# Patient Record
Sex: MALE | Race: BLACK OR AFRICAN AMERICAN
[De-identification: names, ages, dates, MRNs, and addresses within clinical notes are randomized per-mention and may not be internally consistent; named-entity substitution may affect disease eponyms.]

---

## 2018-04-10 ENCOUNTER — HOSPITAL ENCOUNTER (EMERGENCY)
Dept: HOSPITAL 87 - ER | Age: 43
Discharge: LEFT BEFORE DISCHARGE | End: 2018-04-10
Payer: MEDICAID

## 2018-04-10 VITALS — DIASTOLIC BLOOD PRESSURE: 84 MMHG | SYSTOLIC BLOOD PRESSURE: 130 MMHG

## 2018-04-10 VITALS — WEIGHT: 191.8 LBS | BODY MASS INDEX: 30.82 KG/M2 | HEIGHT: 66 IN

## 2018-04-10 DIAGNOSIS — M41.9: ICD-10-CM

## 2018-04-10 DIAGNOSIS — J44.9: ICD-10-CM

## 2018-04-10 DIAGNOSIS — Z88.8: ICD-10-CM

## 2018-04-10 DIAGNOSIS — F17.200: ICD-10-CM

## 2018-04-10 DIAGNOSIS — Z79.82: ICD-10-CM

## 2018-04-10 DIAGNOSIS — R07.9: Primary | ICD-10-CM

## 2018-04-10 DIAGNOSIS — R68.89: ICD-10-CM

## 2018-04-10 LAB
BASOPHILS NFR BLD AUTO: 0.8 % (ref 0–2)
CHLORIDE SERPL-SCNC: 106 MEQ/L (ref 98–107)
EOSINOPHIL NFR BLD AUTO: 3.4 % (ref 0–5)
ERYTHROCYTE [DISTWIDTH] IN BLOOD BY AUTOMATED COUNT: 13.1 % (ref 11.6–14.6)
ETHANOL SERPL-MCNC: < 10 MG/DL
HCT VFR BLD AUTO: 43 % (ref 42–52)
HGB BLD-MCNC: 15 G/DL (ref 14–18)
INR PPP: 1
LYMPHOCYTES NFR BLD AUTO: 42.1 % (ref 20–50)
MCH RBC QN AUTO: 33 PG (ref 28–32)
MCV RBC AUTO: 94.7 FL (ref 80–94)
MONOCYTES NFR BLD AUTO: 8.9 % (ref 2–8)
NEUTROPHILS NFR BLD AUTO: 44.8 % (ref 40–76)
PLATELET # BLD AUTO: 182 X1000/UL (ref 130–400)
PMV BLD AUTO: 8.7 FL (ref 7.4–10.4)
PROTHROMBIN TIME: 10.8 SEC (ref 9.4–11.6)
RBC # BLD AUTO: 4.55 MILL/UL (ref 4.7–6.1)

## 2018-04-10 PROCEDURE — 93005 ELECTROCARDIOGRAM TRACING: CPT

## 2018-04-10 PROCEDURE — 83880 ASSAY OF NATRIURETIC PEPTIDE: CPT

## 2018-04-10 PROCEDURE — 99285 EMERGENCY DEPT VISIT HI MDM: CPT

## 2018-04-10 PROCEDURE — 85610 PROTHROMBIN TIME: CPT

## 2018-04-10 PROCEDURE — 94640 AIRWAY INHALATION TREATMENT: CPT

## 2018-04-10 PROCEDURE — 84484 ASSAY OF TROPONIN QUANT: CPT

## 2018-04-10 PROCEDURE — 80053 COMPREHEN METABOLIC PANEL: CPT

## 2018-04-10 PROCEDURE — 71045 X-RAY EXAM CHEST 1 VIEW: CPT

## 2018-04-10 PROCEDURE — 85025 COMPLETE CBC W/AUTO DIFF WBC: CPT

## 2018-04-10 PROCEDURE — 87804 INFLUENZA ASSAY W/OPTIC: CPT

## 2018-04-10 PROCEDURE — 36415 COLL VENOUS BLD VENIPUNCTURE: CPT

## 2018-08-14 NOTE — NUR
-------------------------------------------------------------------------------

           *** Note undone in [facility] - 08/14/18 at 1906 by LUCINDA ***           

-------------------------------------------------------------------------------

Patient discharged to home in stable condition. Written and verbal after care 
instructions given. Patient verbalizes understanding of instruction.

## 2018-08-14 NOTE — NUR
C/O FEELING SUICIDAL WITH PLAN TO JUMP OFF THE BRIDGE, NAD NOTED, VSS, RESP 
EVEN AND UNLABORED, PT WAS PUT ON MONITOR, WAITING FOR MD SNOW

## 2018-09-27 ENCOUNTER — HOSPITAL ENCOUNTER (EMERGENCY)
Dept: HOSPITAL 54 - ER | Age: 43
Discharge: LEFT BEFORE DISCHARGE | End: 2018-09-27
Payer: COMMERCIAL

## 2018-09-27 DIAGNOSIS — Z53.21: Primary | ICD-10-CM

## 2019-08-20 ENCOUNTER — HOSPITAL ENCOUNTER (EMERGENCY)
Dept: HOSPITAL 54 - ER | Age: 44
Discharge: PSYCHIATRIC OR SUBSTANCE USE FACILITY | End: 2019-08-20
Payer: COMMERCIAL

## 2019-08-20 VITALS — WEIGHT: 188 LBS | HEIGHT: 72 IN | BODY MASS INDEX: 25.47 KG/M2

## 2019-08-20 VITALS — DIASTOLIC BLOOD PRESSURE: 80 MMHG | SYSTOLIC BLOOD PRESSURE: 128 MMHG

## 2019-08-20 DIAGNOSIS — Z88.6: ICD-10-CM

## 2019-08-20 DIAGNOSIS — M32.11: ICD-10-CM

## 2019-08-20 DIAGNOSIS — Z88.8: ICD-10-CM

## 2019-08-20 DIAGNOSIS — F29: Primary | ICD-10-CM
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
## 2019-08-20 DIAGNOSIS — F10.10: ICD-10-CM

## 2019-08-20 DIAGNOSIS — F41.9: ICD-10-CM

## 2019-08-20 DIAGNOSIS — F32.9: ICD-10-CM

## 2019-08-20 DIAGNOSIS — I10: ICD-10-CM

## 2019-08-20 DIAGNOSIS — F12.10: ICD-10-CM

## 2019-08-20 DIAGNOSIS — J44.9: ICD-10-CM

## 2019-08-20 DIAGNOSIS — L30.8: ICD-10-CM

## 2019-08-20 DIAGNOSIS — Z59.0: ICD-10-CM

## 2019-08-20 LAB
ALBUMIN SERPL BCP-MCNC: 3.8 G/DL (ref 3.4–5)
ALP SERPL-CCNC: 69 U/L (ref 46–116)
ALT SERPL W P-5'-P-CCNC: 21 U/L (ref 12–78)
APAP SERPL-MCNC: < 10 UG/ML (ref 10–30)
AST SERPL W P-5'-P-CCNC: 17 U/L (ref 15–37)
BASOPHILS # BLD AUTO: 0 /CMM (ref 0–0.2)
BASOPHILS NFR BLD AUTO: 0.4 % (ref 0–2)
BILIRUB DIRECT SERPL-MCNC: 0.1 MG/DL (ref 0–0.2)
BILIRUB SERPL-MCNC: 0.6 MG/DL (ref 0.2–1)
BILIRUB UR QL STRIP: (no result)
BUN SERPL-MCNC: 18 MG/DL (ref 7–18)
CALCIUM SERPL-MCNC: 9.1 MG/DL (ref 8.5–10.1)
CHLORIDE SERPL-SCNC: 102 MMOL/L (ref 98–107)
CO2 SERPL-SCNC: 27 MMOL/L (ref 21–32)
COLOR UR: (no result)
CREAT SERPL-MCNC: 1.1 MG/DL (ref 0.6–1.3)
EOSINOPHIL NFR BLD AUTO: 1.8 % (ref 0–6)
ETHANOL SERPL-MCNC: < 3 MG/DL (ref 0–0)
GLUCOSE SERPL-MCNC: 124 MG/DL (ref 74–106)
HCT VFR BLD AUTO: 50 % (ref 39–51)
HGB BLD-MCNC: 17.4 G/DL (ref 13.5–17.5)
KETONES UR STRIP-MCNC: 40 MG/DL
LYMPHOCYTES NFR BLD AUTO: 2.4 /CMM (ref 0.8–4.8)
LYMPHOCYTES NFR BLD AUTO: 37.6 % (ref 20–44)
MCHC RBC AUTO-ENTMCNC: 35 G/DL (ref 31–36)
MCV RBC AUTO: 98 FL (ref 80–96)
MONOCYTES NFR BLD AUTO: 0.7 /CMM (ref 0.1–1.3)
MONOCYTES NFR BLD AUTO: 10.2 % (ref 2–12)
NEUTROPHILS # BLD AUTO: 3.2 /CMM (ref 1.8–8.9)
NEUTROPHILS NFR BLD AUTO: 50 % (ref 43–81)
PH UR STRIP: 5.5 [PH] (ref 5–8)
PLATELET # BLD AUTO: 150 /CMM (ref 150–450)
POTASSIUM SERPL-SCNC: 3.8 MMOL/L (ref 3.5–5.1)
PROT SERPL-MCNC: 7.5 G/DL (ref 6.4–8.2)
PROT UR QL STRIP: 30 MG/DL
RBC # BLD AUTO: 5.1 MIL/UL (ref 4.5–6)
RBC #/AREA URNS HPF: (no result) /HPF (ref 0–2)
SALICYLATES SERPL-MCNC: 9.4 MG/DL (ref 2.8–20)
SODIUM SERPL-SCNC: 139 MMOL/L (ref 136–145)
UROBILINOGEN UR STRIP-MCNC: 4 EU/DL
WBC #/AREA URNS HPF: (no result) /HPF (ref 0–3)
WBC NRBC COR # BLD AUTO: 6.5 K/UL (ref 4.3–11)

## 2019-08-20 PROCEDURE — G0480 DRUG TEST DEF 1-7 CLASSES: HCPCS

## 2019-08-20 PROCEDURE — 80329 ANALGESICS NON-OPIOID 1 OR 2: CPT

## 2019-08-20 PROCEDURE — 99285 EMERGENCY DEPT VISIT HI MDM: CPT

## 2019-08-20 PROCEDURE — 80307 DRUG TEST PRSMV CHEM ANLYZR: CPT

## 2019-08-20 PROCEDURE — 81001 URINALYSIS AUTO W/SCOPE: CPT

## 2019-08-20 PROCEDURE — 36415 COLL VENOUS BLD VENIPUNCTURE: CPT

## 2019-08-20 PROCEDURE — 80305 DRUG TEST PRSMV DIR OPT OBS: CPT

## 2019-08-20 PROCEDURE — 80048 BASIC METABOLIC PNL TOTAL CA: CPT

## 2019-08-20 PROCEDURE — 85025 COMPLETE CBC W/AUTO DIFF WBC: CPT

## 2019-08-20 PROCEDURE — 80076 HEPATIC FUNCTION PANEL: CPT

## 2019-08-20 SDOH — ECONOMIC STABILITY - HOUSING INSECURITY: HOMELESSNESS: Z59.0

## 2019-08-20 NOTE — NUR
"Feeling Suicidal for 4-5days I want to go into traffic or cut myself". PT 
AAOX3, VSS. DENIES CP, SOB, DIZZINESS, N/V @ THIS TIME. SEEN & EVAL'D BY 
TWAN ELLER. CALM & COOPERATIVE, WILMAN @ BS & NELLY CONT TO MONITOR.

## 2019-08-20 NOTE — NUR
CALL FROM TERRENCE BORGES. [facility] BY [facility] WEST. # FOR REPORT [phone]

## 2019-08-20 NOTE — NUR
Social service consult requested by TWAN Wyman for suicidal ideations and a plan to run into 
traffic. Pt. is a 43 year old male who came to [facility] for suicidal ideation and wanting 
voluntary psychiatric admission. Pt. has been to [facility] in the past for the same complaint. SW 
met with pt. bedside. Pt. is alert and oriented x 4. Pt. appears disheveled and unkempt. 
Pt's eyes are blood shot red.  Pt. stated, " I am suicidal and have a plan to run into 
traffic." Pt. has a history of psychiatric hospitalizations. Pt. was taking Xanax but 
stopped taking it abruptly. Pt. is currently homeless and he has been staying on the streets 
for the past 10 years. When SW inquired why he hasn't linked with services in the community 
to aid in his homelessness, pt. shrugs his shoulders. Pt. receives GR and food stamps. Pt. 
drinks approximately 3 to 4 bottles of beer daily and smokes marijuana. Pt. will require UD 
for toxicology. Pt. is requesting voluntary admit to [facility]. 
JOSEPH Whitley has been notified of pt. [facility]. JOSEPH Whitley to fax clinicals to [facility] intake department once lab results are in. TWAN Wyman is 
aware of pt's discharge plan.

## 2019-11-09 ENCOUNTER — HOSPITAL ENCOUNTER (EMERGENCY)
Dept: HOSPITAL 54 - ER | Age: 44
LOS: 1 days | Discharge: PSYCHIATRIC OR SUBSTANCE USE FACILITY | End: 2019-11-10
Payer: COMMERCIAL

## 2019-11-09 VITALS — WEIGHT: 200 LBS | BODY MASS INDEX: 32.14 KG/M2 | HEIGHT: 66 IN

## 2019-11-09 DIAGNOSIS — Z88.8: ICD-10-CM

## 2019-11-09 DIAGNOSIS — J44.9: ICD-10-CM

## 2019-11-09 DIAGNOSIS — R45.851: Primary | ICD-10-CM

## 2019-11-09 DIAGNOSIS — Z88.6: ICD-10-CM

## 2019-11-09 DIAGNOSIS — F10.10: ICD-10-CM

## 2019-11-09 DIAGNOSIS — M79.661: ICD-10-CM

## 2019-11-09 DIAGNOSIS — I10: ICD-10-CM

## 2019-11-09 DIAGNOSIS — F12.10: ICD-10-CM

## 2019-11-09 DIAGNOSIS — F32.9: ICD-10-CM

## 2019-11-09 DIAGNOSIS — F17.210: ICD-10-CM

## 2019-11-09 DIAGNOSIS — Z59.0: ICD-10-CM
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
## 2019-11-09 DIAGNOSIS — Z98.890: ICD-10-CM

## 2019-11-09 DIAGNOSIS — M79.662: ICD-10-CM

## 2019-11-09 LAB
ALBUMIN SERPL BCP-MCNC: 3.2 G/DL (ref 3.4–5)
ALP SERPL-CCNC: 57 U/L (ref 46–116)
ALT SERPL W P-5'-P-CCNC: 27 U/L (ref 12–78)
APAP SERPL-MCNC: < 2 UG/ML (ref 10–30)
AST SERPL W P-5'-P-CCNC: 22 U/L (ref 15–37)
BASOPHILS # BLD AUTO: 0.1 /CMM (ref 0–0.2)
BASOPHILS NFR BLD AUTO: 1.1 % (ref 0–2)
BILIRUB DIRECT SERPL-MCNC: 0.1 MG/DL (ref 0–0.2)
BILIRUB SERPL-MCNC: 0.3 MG/DL (ref 0.2–1)
BUN SERPL-MCNC: 22 MG/DL (ref 7–18)
CALCIUM SERPL-MCNC: 9.2 MG/DL (ref 8.5–10.1)
CHLORIDE SERPL-SCNC: 104 MMOL/L (ref 98–107)
CO2 SERPL-SCNC: 26 MMOL/L (ref 21–32)
CREAT SERPL-MCNC: 1.1 MG/DL (ref 0.6–1.3)
EOSINOPHIL NFR BLD AUTO: 2.9 % (ref 0–6)
ETHANOL SERPL-MCNC: < 3 MG/DL (ref 0–0)
GLUCOSE SERPL-MCNC: 113 MG/DL (ref 74–106)
HCT VFR BLD AUTO: 43 % (ref 39–51)
HGB BLD-MCNC: 14.7 G/DL (ref 13.5–17.5)
LYMPHOCYTES NFR BLD AUTO: 2.6 /CMM (ref 0.8–4.8)
LYMPHOCYTES NFR BLD AUTO: 31.4 % (ref 20–44)
MCHC RBC AUTO-ENTMCNC: 34 G/DL (ref 31–36)
MCV RBC AUTO: 98 FL (ref 80–96)
MONOCYTES NFR BLD AUTO: 0.9 /CMM (ref 0.1–1.3)
MONOCYTES NFR BLD AUTO: 11.5 % (ref 2–12)
NEUTROPHILS # BLD AUTO: 4.4 /CMM (ref 1.8–8.9)
NEUTROPHILS NFR BLD AUTO: 53.1 % (ref 43–81)
PH UR STRIP: 7.5 [PH] (ref 5–8)
PLATELET # BLD AUTO: 216 /CMM (ref 150–450)
POTASSIUM SERPL-SCNC: 4.4 MMOL/L (ref 3.5–5.1)
PROT SERPL-MCNC: 7.2 G/DL (ref 6.4–8.2)
RBC # BLD AUTO: 4.41 MIL/UL (ref 4.5–6)
RBC #/AREA URNS HPF: (no result) /HPF (ref 0–2)
SALICYLATES SERPL-MCNC: < 2.8 MG/DL (ref 2.8–20)
SODIUM SERPL-SCNC: 140 MMOL/L (ref 136–145)
UROBILINOGEN UR STRIP-MCNC: 4 EU/DL
WBC #/AREA URNS HPF: (no result) /HPF (ref 0–3)
WBC NRBC COR # BLD AUTO: 8.2 K/UL (ref 4.3–11)

## 2019-11-09 PROCEDURE — 36415 COLL VENOUS BLD VENIPUNCTURE: CPT

## 2019-11-09 PROCEDURE — 99285 EMERGENCY DEPT VISIT HI MDM: CPT

## 2019-11-09 PROCEDURE — 80329 ANALGESICS NON-OPIOID 1 OR 2: CPT

## 2019-11-09 PROCEDURE — 80048 BASIC METABOLIC PNL TOTAL CA: CPT

## 2019-11-09 PROCEDURE — 85025 COMPLETE CBC W/AUTO DIFF WBC: CPT

## 2019-11-09 PROCEDURE — 80076 HEPATIC FUNCTION PANEL: CPT

## 2019-11-09 PROCEDURE — 81001 URINALYSIS AUTO W/SCOPE: CPT

## 2019-11-09 PROCEDURE — 73590 X-RAY EXAM OF LOWER LEG: CPT

## 2019-11-09 PROCEDURE — G0480 DRUG TEST DEF 1-7 CLASSES: HCPCS

## 2019-11-09 PROCEDURE — 80305 DRUG TEST PRSMV DIR OPT OBS: CPT

## 2019-11-09 PROCEDURE — 80307 DRUG TEST PRSMV CHEM ANLYZR: CPT

## 2019-11-09 SDOH — ECONOMIC STABILITY - HOUSING INSECURITY: HOMELESSNESS: Z59.0

## 2019-11-10 VITALS — DIASTOLIC BLOOD PRESSURE: 84 MMHG | SYSTOLIC BLOOD PRESSURE: 142 MMHG

## 2019-11-10 NOTE — NUR
TRANSFER INFO:

[facility]  [facility], ACCEPTING MD IS DR. POSEY, NUMBER FOR REPORT 
[phone]. SPOKE TO RAZIA.

-------------------------------------------------------------------------------

Addendum: 11/10/19 at 1358 by HERB

-------------------------------------------------------------------------------

TRANSFER INFO:

[facility], ACCEPTING MD IS DR. POSEY, NUMBER FOR REPORT 
[phone]. SPOKE TO RAZIA.

## 2019-11-10 NOTE — NUR
patient awake alert to name non distress @ this time sitter @ bedside follows 
command continue to monitor

## 2019-11-10 NOTE — NUR
Patient is resting comfortably in bed with eyes closed. Easily aroused. VSS. 
GIVEN SANDWICH. AMBULATED TO RESTROOM.

## 2019-11-10 NOTE — NUR
PT IS AWAKE, AAOX4, NOT IN RESPIRATORY DISTRESS, V/S STABLE, KEPT RESTED AND 
COMFORTABLE, WILL CONTINUE TO MONITOR.

## 2019-11-10 NOTE — NUR
Patient is resting comfortably in bed with eyes closed. Easily aroused. VSS. 
AMBULATED TO RESTROOM. -ACUTE DISTRESS NOTED.

## 2020-01-27 ENCOUNTER — HOSPITAL ENCOUNTER (EMERGENCY)
Dept: HOSPITAL 54 - ER | Age: 45
Discharge: NURSING, REHAB OR LONG-TERM CARE | End: 2020-01-27
Payer: COMMERCIAL

## 2020-01-27 VITALS — WEIGHT: 200 LBS | HEIGHT: 66 IN | BODY MASS INDEX: 32.14 KG/M2

## 2020-01-27 VITALS — SYSTOLIC BLOOD PRESSURE: 124 MMHG | DIASTOLIC BLOOD PRESSURE: 62 MMHG

## 2020-01-27 DIAGNOSIS — J45.909: ICD-10-CM

## 2020-01-27 DIAGNOSIS — I10: ICD-10-CM
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
## 2020-01-27 DIAGNOSIS — Z88.8: ICD-10-CM

## 2020-01-27 DIAGNOSIS — Z59.0: ICD-10-CM

## 2020-01-27 DIAGNOSIS — F17.200: ICD-10-CM

## 2020-01-27 DIAGNOSIS — F10.10: ICD-10-CM

## 2020-01-27 DIAGNOSIS — Z88.6: ICD-10-CM

## 2020-01-27 DIAGNOSIS — J21.9: Primary | ICD-10-CM

## 2020-01-27 DIAGNOSIS — Z98.890: ICD-10-CM

## 2020-01-27 LAB
BASOPHILS # BLD AUTO: 0.1 /CMM (ref 0–0.2)
BASOPHILS NFR BLD AUTO: 0.7 % (ref 0–2)
BUN SERPL-MCNC: 22 MG/DL (ref 7–18)
CALCIUM SERPL-MCNC: 9 MG/DL (ref 8.5–10.1)
CHLORIDE SERPL-SCNC: 106 MMOL/L (ref 98–107)
CO2 SERPL-SCNC: 26 MMOL/L (ref 21–32)
CREAT SERPL-MCNC: 1.2 MG/DL (ref 0.6–1.3)
EOSINOPHIL NFR BLD AUTO: 2.8 % (ref 0–6)
GLUCOSE SERPL-MCNC: 223 MG/DL (ref 74–106)
HCT VFR BLD AUTO: 43 % (ref 39–51)
HGB BLD-MCNC: 14.6 G/DL (ref 13.5–17.5)
LYMPHOCYTES NFR BLD AUTO: 1.7 /CMM (ref 0.8–4.8)
LYMPHOCYTES NFR BLD AUTO: 22 % (ref 20–44)
MCHC RBC AUTO-ENTMCNC: 34 G/DL (ref 31–36)
MCV RBC AUTO: 96 FL (ref 80–96)
MONOCYTES NFR BLD AUTO: 0.6 /CMM (ref 0.1–1.3)
MONOCYTES NFR BLD AUTO: 7.3 % (ref 2–12)
NEUTROPHILS # BLD AUTO: 5.1 /CMM (ref 1.8–8.9)
NEUTROPHILS NFR BLD AUTO: 67.2 % (ref 43–81)
PLATELET # BLD AUTO: 182 /CMM (ref 150–450)
POTASSIUM SERPL-SCNC: 4 MMOL/L (ref 3.5–5.1)
RBC # BLD AUTO: 4.46 MIL/UL (ref 4.5–6)
SODIUM SERPL-SCNC: 141 MMOL/L (ref 136–145)
WBC NRBC COR # BLD AUTO: 7.6 K/UL (ref 4.3–11)

## 2020-01-27 SDOH — ECONOMIC STABILITY - HOUSING INSECURITY: HOMELESSNESS: Z59.0

## 2020-01-27 NOTE — NUR
[id number] 44 year old male from [facility], c/o cough, fever, sob in 
inspiration. alert and oriented x3, breathing even unlabored. skin intact. 
waiting to be seen by md.

## 2020-04-15 ENCOUNTER — HOSPITAL ENCOUNTER (EMERGENCY)
Dept: HOSPITAL 54 - ER | Age: 45
LOS: 1 days | Discharge: ANOTHER HOSPITAL | End: 2020-04-16
Payer: MEDICAID

## 2020-04-15 VITALS — HEIGHT: 66 IN | WEIGHT: 180 LBS | BODY MASS INDEX: 28.93 KG/M2

## 2020-04-15 DIAGNOSIS — M19.90: ICD-10-CM

## 2020-04-15 DIAGNOSIS — F12.10: ICD-10-CM

## 2020-04-15 DIAGNOSIS — R45.851: Primary | ICD-10-CM

## 2020-04-15 DIAGNOSIS — I10: ICD-10-CM

## 2020-04-15 DIAGNOSIS — J44.9: ICD-10-CM

## 2020-04-15 DIAGNOSIS — R22.43: ICD-10-CM

## 2020-04-15 DIAGNOSIS — Z88.8: ICD-10-CM

## 2020-04-15 DIAGNOSIS — F17.210: ICD-10-CM

## 2020-04-15 DIAGNOSIS — F32.9: ICD-10-CM

## 2020-04-15 DIAGNOSIS — Z59.0: ICD-10-CM

## 2020-04-15 DIAGNOSIS — Z98.890: ICD-10-CM

## 2020-04-15 DIAGNOSIS — Z88.6: ICD-10-CM

## 2020-04-15 LAB
ALBUMIN SERPL BCP-MCNC: 3.6 G/DL (ref 3.4–5)
ALP SERPL-CCNC: 67 U/L (ref 46–116)
ALT SERPL W P-5'-P-CCNC: 23 U/L (ref 12–78)
APAP SERPL-MCNC: < 1 UG/ML (ref 10–30)
APPEARANCE UR: (no result)
AST SERPL W P-5'-P-CCNC: 15 U/L (ref 15–37)
BASOPHILS # BLD AUTO: 0.1 /CMM (ref 0–0.2)
BASOPHILS NFR BLD AUTO: 0.8 % (ref 0–2)
BILIRUB DIRECT SERPL-MCNC: 0.1 MG/DL (ref 0–0.2)
BILIRUB SERPL-MCNC: 0.3 MG/DL (ref 0.2–1)
BILIRUB UR QL STRIP: (no result)
BUN SERPL-MCNC: 18 MG/DL (ref 7–18)
CALCIUM SERPL-MCNC: 8.6 MG/DL (ref 8.5–10.1)
CHLORIDE SERPL-SCNC: 104 MMOL/L (ref 98–107)
CO2 SERPL-SCNC: 31 MMOL/L (ref 21–32)
CREAT SERPL-MCNC: 1.4 MG/DL (ref 0.6–1.3)
EOSINOPHIL NFR BLD AUTO: 1.9 % (ref 0–6)
ETHANOL SERPL-MCNC: < 3 MG/DL (ref 0–0)
GLUCOSE SERPL-MCNC: 98 MG/DL (ref 74–106)
HCT VFR BLD AUTO: 45 % (ref 39–51)
HGB BLD-MCNC: 15.4 G/DL (ref 13.5–17.5)
KETONES UR STRIP-MCNC: 15 MG/DL
LYMPHOCYTES NFR BLD AUTO: 2.6 /CMM (ref 0.8–4.8)
LYMPHOCYTES NFR BLD AUTO: 36.7 % (ref 20–44)
MCHC RBC AUTO-ENTMCNC: 34 G/DL (ref 31–36)
MCV RBC AUTO: 95 FL (ref 80–96)
MONOCYTES NFR BLD AUTO: 0.7 /CMM (ref 0.1–1.3)
MONOCYTES NFR BLD AUTO: 9.9 % (ref 2–12)
NEUTROPHILS # BLD AUTO: 3.6 /CMM (ref 1.8–8.9)
NEUTROPHILS NFR BLD AUTO: 50.7 % (ref 43–81)
NT-PROBNP SERPL-MCNC: 62 PG/ML (ref 0–125)
PH UR STRIP: 6 [PH] (ref 5–8)
PLATELET # BLD AUTO: 179 /CMM (ref 150–450)
POTASSIUM SERPL-SCNC: 4 MMOL/L (ref 3.5–5.1)
PROT SERPL-MCNC: 7 G/DL (ref 6.4–8.2)
RBC # BLD AUTO: 4.69 MIL/UL (ref 4.5–6)
RBC #/AREA URNS HPF: (no result) /HPF (ref 0–2)
SALICYLATES SERPL-MCNC: 7.5 MG/DL (ref 2.8–20)
SODIUM SERPL-SCNC: 139 MMOL/L (ref 136–145)
UROBILINOGEN UR STRIP-MCNC: 0.2 EU/DL
WBC #/AREA URNS HPF: (no result) /HPF (ref 0–3)
WBC NRBC COR # BLD AUTO: 7.1 K/UL (ref 4.3–11)

## 2020-04-15 PROCEDURE — 36415 COLL VENOUS BLD VENIPUNCTURE: CPT

## 2020-04-15 PROCEDURE — 93970 EXTREMITY STUDY: CPT

## 2020-04-15 PROCEDURE — 93005 ELECTROCARDIOGRAM TRACING: CPT

## 2020-04-15 PROCEDURE — 83880 ASSAY OF NATRIURETIC PEPTIDE: CPT

## 2020-04-15 PROCEDURE — 80305 DRUG TEST PRSMV DIR OPT OBS: CPT

## 2020-04-15 PROCEDURE — 71045 X-RAY EXAM CHEST 1 VIEW: CPT

## 2020-04-15 PROCEDURE — G0480 DRUG TEST DEF 1-7 CLASSES: HCPCS

## 2020-04-15 PROCEDURE — 80307 DRUG TEST PRSMV CHEM ANLYZR: CPT

## 2020-04-15 PROCEDURE — 80329 ANALGESICS NON-OPIOID 1 OR 2: CPT

## 2020-04-15 PROCEDURE — 80076 HEPATIC FUNCTION PANEL: CPT

## 2020-04-15 PROCEDURE — 84484 ASSAY OF TROPONIN QUANT: CPT

## 2020-04-15 PROCEDURE — 85025 COMPLETE CBC W/AUTO DIFF WBC: CPT

## 2020-04-15 PROCEDURE — 99285 EMERGENCY DEPT VISIT HI MDM: CPT

## 2020-04-15 PROCEDURE — 80048 BASIC METABOLIC PNL TOTAL CA: CPT

## 2020-04-15 PROCEDURE — 81001 URINALYSIS AUTO W/SCOPE: CPT

## 2020-04-15 PROCEDURE — 71046 X-RAY EXAM CHEST 2 VIEWS: CPT

## 2020-04-15 SDOH — ECONOMIC STABILITY - HOUSING INSECURITY: HOMELESSNESS: Z59.0

## 2020-04-15 NOTE — NUR
PATIENT CAME TO ER BED 15 C/O SUICIDAL IDEATION PLANS TO RUN INTO TRAFFIC. 
PATIENT IS EASILY IRRITABLE. DENIES ANY PAIN. BREATHING EVENLY AND UNLABORED ON 
ROOM AIR. CONNECTED TO MONITOR. SITTER AT BEDSIDE. PATIENT'S BELONGINGS AND 
CLOTHINGS ARE REMOVED AND PLACED INTO A LOCKER. PATIENT IS CHANGED IN A GOWN.

## 2020-04-16 VITALS — SYSTOLIC BLOOD PRESSURE: 127 MMHG | DIASTOLIC BLOOD PRESSURE: 71 MMHG

## 2020-04-16 NOTE — NUR
PATIENT IS MAKING INAPPROPRIATE COMMENTS TO STAFF. PATIENT IS UNDIRECTABLE. 
PATIENT IS ASKING FOR "SOMETHING TO HELP RELAX" OR SLEEPING AID. MD NOTIFIED.

## 2020-04-16 NOTE — NUR
REPORT GIVEN TO BEV YOUNGBLOOD AT [facility] FOR RYLAN.

## 2020-04-16 NOTE — NUR
-------------------------------------------------------------------------------

          *** Note undone in ED - 04/16/20 at 0430 by CBATAJORDY ***           

-------------------------------------------------------------------------------

Pt accepted at [facility] by Dr. Camargo. # for 
report [phone].

## 2020-06-29 ENCOUNTER — HOSPITAL ENCOUNTER (EMERGENCY)
Dept: HOSPITAL 87 - ER | Age: 45
Discharge: HOME | End: 2020-06-29
Payer: MEDICAID

## 2020-06-29 VITALS — DIASTOLIC BLOOD PRESSURE: 63 MMHG | SYSTOLIC BLOOD PRESSURE: 116 MMHG

## 2020-06-29 VITALS — BODY MASS INDEX: 34.01 KG/M2 | HEIGHT: 66 IN | WEIGHT: 211.64 LBS

## 2020-06-29 DIAGNOSIS — Z76.0: Primary | ICD-10-CM

## 2020-06-29 PROCEDURE — 99283 EMERGENCY DEPT VISIT LOW MDM: CPT

## 2020-10-29 ENCOUNTER — HOSPITAL ENCOUNTER (EMERGENCY)
Dept: HOSPITAL 54 - ER | Age: 45
Discharge: PSYCHIATRIC OR SUBSTANCE USE FACILITY | End: 2020-10-29
Payer: MEDICAID

## 2020-10-29 VITALS — WEIGHT: 240 LBS | HEIGHT: 66 IN | BODY MASS INDEX: 38.57 KG/M2

## 2020-10-29 VITALS — DIASTOLIC BLOOD PRESSURE: 83 MMHG | SYSTOLIC BLOOD PRESSURE: 143 MMHG

## 2020-10-29 DIAGNOSIS — Z88.8: ICD-10-CM

## 2020-10-29 DIAGNOSIS — F20.0: ICD-10-CM

## 2020-10-29 DIAGNOSIS — F31.9: ICD-10-CM

## 2020-10-29 DIAGNOSIS — I48.91: ICD-10-CM

## 2020-10-29 DIAGNOSIS — J44.9: ICD-10-CM

## 2020-10-29 DIAGNOSIS — N32.81: ICD-10-CM

## 2020-10-29 DIAGNOSIS — I10: ICD-10-CM

## 2020-10-29 DIAGNOSIS — M19.90: ICD-10-CM

## 2020-10-29 DIAGNOSIS — Z88.6: ICD-10-CM

## 2020-10-29 DIAGNOSIS — R45.851: Primary | ICD-10-CM

## 2020-10-29 DIAGNOSIS — M54.5: ICD-10-CM

## 2020-10-29 DIAGNOSIS — F17.210: ICD-10-CM

## 2020-10-29 DIAGNOSIS — Z20.828: ICD-10-CM

## 2020-10-29 DIAGNOSIS — G89.29: ICD-10-CM

## 2020-10-29 LAB
ALBUMIN SERPL BCP-MCNC: 3.9 G/DL (ref 3.4–5)
ALP SERPL-CCNC: 71 U/L (ref 46–116)
ALT SERPL W P-5'-P-CCNC: 20 U/L (ref 12–78)
APAP SERPL-MCNC: < 10 UG/ML (ref 10–30)
APPEARANCE UR: CLEAR
AST SERPL W P-5'-P-CCNC: 20 U/L (ref 15–37)
BASOPHILS # BLD AUTO: 0.1 /CMM (ref 0–0.2)
BASOPHILS NFR BLD AUTO: 0.7 % (ref 0–2)
BILIRUB DIRECT SERPL-MCNC: 0.1 MG/DL (ref 0–0.2)
BILIRUB SERPL-MCNC: 0.5 MG/DL (ref 0.2–1)
BILIRUB UR QL STRIP: (no result)
BUN SERPL-MCNC: 20 MG/DL (ref 7–18)
CALCIUM SERPL-MCNC: 9 MG/DL (ref 8.5–10.1)
CHLORIDE SERPL-SCNC: 99 MMOL/L (ref 98–107)
CO2 SERPL-SCNC: 30 MMOL/L (ref 21–32)
COLOR UR: YELLOW
CREAT SERPL-MCNC: 1.2 MG/DL (ref 0.6–1.3)
EOSINOPHIL NFR BLD AUTO: 1.7 % (ref 0–6)
ETHANOL SERPL-MCNC: < 3 MG/DL (ref 0–0)
GLUCOSE SERPL-MCNC: 88 MG/DL (ref 74–106)
GLUCOSE UR STRIP-MCNC: NEGATIVE MG/DL
HCT VFR BLD AUTO: 46 % (ref 39–51)
HGB BLD-MCNC: 15.6 G/DL (ref 13.5–17.5)
HGB UR QL STRIP: NEGATIVE ERY/UL
LEUKOCYTE ESTERASE UR QL STRIP: NEGATIVE
LYMPHOCYTES NFR BLD AUTO: 3.8 /CMM (ref 0.8–4.8)
LYMPHOCYTES NFR BLD AUTO: 36.4 % (ref 20–44)
MCHC RBC AUTO-ENTMCNC: 34 G/DL (ref 31–36)
MCV RBC AUTO: 93 FL (ref 80–96)
MONOCYTES NFR BLD AUTO: 0.9 /CMM (ref 0.1–1.3)
MONOCYTES NFR BLD AUTO: 8.3 % (ref 2–12)
NEUTROPHILS # BLD AUTO: 5.5 /CMM (ref 1.8–8.9)
NEUTROPHILS NFR BLD AUTO: 52.9 % (ref 43–81)
NITRITE UR QL STRIP: NEGATIVE
PH UR STRIP: 6 [PH] (ref 5–8)
PLATELET # BLD AUTO: 234 /CMM (ref 150–450)
POTASSIUM SERPL-SCNC: 3.6 MMOL/L (ref 3.5–5.1)
PROT SERPL-MCNC: 8.1 G/DL (ref 6.4–8.2)
PROT UR QL STRIP: (no result) MG/DL
RBC # BLD AUTO: 4.94 MIL/UL (ref 4.5–6)
RBC #/AREA URNS HPF: (no result) /HPF (ref 0–2)
SODIUM SERPL-SCNC: 134 MMOL/L (ref 136–145)
UROBILINOGEN UR STRIP-MCNC: 0.2 EU/DL
WBC #/AREA URNS HPF: (no result) /HPF (ref 0–3)
WBC NRBC COR # BLD AUTO: 10.4 K/UL (ref 4.3–11)

## 2020-10-29 PROCEDURE — 80307 DRUG TEST PRSMV CHEM ANLYZR: CPT

## 2020-10-29 PROCEDURE — C9803 HOPD COVID-19 SPEC COLLECT: HCPCS

## 2020-10-29 PROCEDURE — 99406 BEHAV CHNG SMOKING 3-10 MIN: CPT

## 2020-10-29 PROCEDURE — 81001 URINALYSIS AUTO W/SCOPE: CPT

## 2020-10-29 PROCEDURE — 80299 QUANTITATIVE ASSAY DRUG: CPT

## 2020-10-29 PROCEDURE — 80076 HEPATIC FUNCTION PANEL: CPT

## 2020-10-29 PROCEDURE — 87426 SARSCOV CORONAVIRUS AG IA: CPT

## 2020-10-29 PROCEDURE — G0480 DRUG TEST DEF 1-7 CLASSES: HCPCS

## 2020-10-29 PROCEDURE — 99285 EMERGENCY DEPT VISIT HI MDM: CPT

## 2020-10-29 PROCEDURE — 85025 COMPLETE CBC W/AUTO DIFF WBC: CPT

## 2020-10-29 PROCEDURE — 80320 DRUG SCREEN QUANTALCOHOLS: CPT

## 2020-10-29 PROCEDURE — 80048 BASIC METABOLIC PNL TOTAL CA: CPT

## 2020-10-29 PROCEDURE — 36415 COLL VENOUS BLD VENIPUNCTURE: CPT

## 2020-10-29 NOTE — NUR
CALLED [facility] FOR TRANSPORT TO [facility]. RESERVATION NUMBER [id number]. WILL CALL 
BACK WITH BARRIE

## 2020-10-29 NOTE — NUR
C/O LOW BACK PAIN. SI WITH PLAN TO CUT WRIST. PT ADMITS TO DRINKING, PCP, 
MARIJUANA USE TONIGHT PT TO BED 6, NAD NOTED, SI PRECAUTIONS STARTED, -SOB, 
VSS, PENDING ER PROVIDER GWENDOLYN

## 2020-10-29 NOTE — NUR
Patient has been accepted [facility] per Tito [phone]. Report to be 
given at 11:30am to nursing supervisor [phone] with patient arriving at 12:30pm to 
[facility].

## 2021-01-08 ENCOUNTER — HOSPITAL ENCOUNTER (EMERGENCY)
Dept: HOSPITAL 54 - ER | Age: 46
Discharge: PSYCHIATRIC OR SUBSTANCE USE FACILITY | End: 2021-01-08
Payer: MEDICAID

## 2021-01-08 VITALS — SYSTOLIC BLOOD PRESSURE: 144 MMHG | DIASTOLIC BLOOD PRESSURE: 83 MMHG

## 2021-01-08 VITALS — HEIGHT: 70 IN | BODY MASS INDEX: 31.5 KG/M2 | WEIGHT: 220 LBS

## 2021-01-08 DIAGNOSIS — F31.9: ICD-10-CM

## 2021-01-08 DIAGNOSIS — F20.0: ICD-10-CM

## 2021-01-08 DIAGNOSIS — Z88.6: ICD-10-CM

## 2021-01-08 DIAGNOSIS — I48.91: ICD-10-CM

## 2021-01-08 DIAGNOSIS — Z88.8: ICD-10-CM

## 2021-01-08 DIAGNOSIS — J44.9: ICD-10-CM

## 2021-01-08 DIAGNOSIS — Z20.822: ICD-10-CM

## 2021-01-08 DIAGNOSIS — I10: ICD-10-CM

## 2021-01-08 DIAGNOSIS — F12.10: ICD-10-CM

## 2021-01-08 DIAGNOSIS — Z59.0: ICD-10-CM

## 2021-01-08 DIAGNOSIS — R45.851: Primary | ICD-10-CM

## 2021-01-08 DIAGNOSIS — M19.90: ICD-10-CM

## 2021-01-08 DIAGNOSIS — N32.81: ICD-10-CM

## 2021-01-08 LAB
ALBUMIN SERPL BCP-MCNC: 3.7 G/DL (ref 3.4–5)
ALP SERPL-CCNC: 73 U/L (ref 46–116)
ALT SERPL W P-5'-P-CCNC: 33 U/L (ref 12–78)
APAP SERPL-MCNC: 0 UG/ML (ref 10–30)
AST SERPL W P-5'-P-CCNC: 29 U/L (ref 15–37)
BASOPHILS # BLD AUTO: 0.1 /CMM (ref 0–0.2)
BASOPHILS NFR BLD AUTO: 0.9 % (ref 0–2)
BILIRUB DIRECT SERPL-MCNC: 0.1 MG/DL (ref 0–0.2)
BILIRUB SERPL-MCNC: 0.3 MG/DL (ref 0.2–1)
BUN SERPL-MCNC: 19 MG/DL (ref 7–18)
CALCIUM SERPL-MCNC: 9 MG/DL (ref 8.5–10.1)
CHLORIDE SERPL-SCNC: 100 MMOL/L (ref 98–107)
CO2 SERPL-SCNC: 29 MMOL/L (ref 21–32)
COLOR UR: YELLOW
CREAT SERPL-MCNC: 1.3 MG/DL (ref 0.6–1.3)
EOSINOPHIL NFR BLD AUTO: 1.6 % (ref 0–6)
ETHANOL SERPL-MCNC: < 3 MG/DL (ref 0–0)
GLUCOSE SERPL-MCNC: 141 MG/DL (ref 74–106)
HCT VFR BLD AUTO: 47 % (ref 39–51)
HGB BLD-MCNC: 16.2 G/DL (ref 13.5–17.5)
LYMPHOCYTES NFR BLD AUTO: 3.1 /CMM (ref 0.8–4.8)
LYMPHOCYTES NFR BLD AUTO: 31.5 % (ref 20–44)
MCHC RBC AUTO-ENTMCNC: 35 G/DL (ref 31–36)
MCV RBC AUTO: 93 FL (ref 80–96)
MONOCYTES NFR BLD AUTO: 0.9 /CMM (ref 0.1–1.3)
MONOCYTES NFR BLD AUTO: 9.4 % (ref 2–12)
NEUTROPHILS # BLD AUTO: 5.6 /CMM (ref 1.8–8.9)
NEUTROPHILS NFR BLD AUTO: 56.6 % (ref 43–81)
PH UR STRIP: 6 [PH] (ref 5–8)
PLATELET # BLD AUTO: 182 /CMM (ref 150–450)
POTASSIUM SERPL-SCNC: 4.4 MMOL/L (ref 3.5–5.1)
PROT SERPL-MCNC: 7.5 G/DL (ref 6.4–8.2)
RBC # BLD AUTO: 4.99 MIL/UL (ref 4.5–6)
RBC #/AREA URNS HPF: (no result) /HPF (ref 0–2)
SODIUM SERPL-SCNC: 138 MMOL/L (ref 136–145)
UROBILINOGEN UR STRIP-MCNC: 1 EU/DL
WBC #/AREA URNS HPF: (no result) /HPF (ref 0–3)
WBC NRBC COR # BLD AUTO: 10 K/UL (ref 4.3–11)

## 2021-01-08 PROCEDURE — 36415 COLL VENOUS BLD VENIPUNCTURE: CPT

## 2021-01-08 PROCEDURE — C9803 HOPD COVID-19 SPEC COLLECT: HCPCS

## 2021-01-08 PROCEDURE — 80299 QUANTITATIVE ASSAY DRUG: CPT

## 2021-01-08 PROCEDURE — 80048 BASIC METABOLIC PNL TOTAL CA: CPT

## 2021-01-08 PROCEDURE — 80076 HEPATIC FUNCTION PANEL: CPT

## 2021-01-08 PROCEDURE — 99285 EMERGENCY DEPT VISIT HI MDM: CPT

## 2021-01-08 PROCEDURE — 80320 DRUG SCREEN QUANTALCOHOLS: CPT

## 2021-01-08 PROCEDURE — 85025 COMPLETE CBC W/AUTO DIFF WBC: CPT

## 2021-01-08 PROCEDURE — 80307 DRUG TEST PRSMV CHEM ANLYZR: CPT

## 2021-01-08 PROCEDURE — G0480 DRUG TEST DEF 1-7 CLASSES: HCPCS

## 2021-01-08 PROCEDURE — 87426 SARSCOV CORONAVIRUS AG IA: CPT

## 2021-01-08 PROCEDURE — 81001 URINALYSIS AUTO W/SCOPE: CPT

## 2021-01-08 SDOH — ECONOMIC STABILITY - HOUSING INSECURITY: HOMELESSNESS: Z59.0

## 2021-06-05 ENCOUNTER — HOSPITAL ENCOUNTER (EMERGENCY)
Dept: HOSPITAL 54 - ER | Age: 46
LOS: 1 days | Discharge: PSYCHIATRIC OR SUBSTANCE USE FACILITY | End: 2021-06-06
Payer: MEDICAID

## 2021-06-05 VITALS — HEIGHT: 70 IN | WEIGHT: 220 LBS | BODY MASS INDEX: 31.5 KG/M2

## 2021-06-05 DIAGNOSIS — F20.0: ICD-10-CM

## 2021-06-05 DIAGNOSIS — Z20.822: ICD-10-CM

## 2021-06-05 DIAGNOSIS — I48.91: ICD-10-CM

## 2021-06-05 DIAGNOSIS — M19.90: ICD-10-CM

## 2021-06-05 DIAGNOSIS — F31.9: ICD-10-CM

## 2021-06-05 DIAGNOSIS — Z88.6: ICD-10-CM

## 2021-06-05 DIAGNOSIS — J44.9: ICD-10-CM

## 2021-06-05 DIAGNOSIS — Z88.8: ICD-10-CM

## 2021-06-05 DIAGNOSIS — N32.81: ICD-10-CM

## 2021-06-05 DIAGNOSIS — Z59.0: ICD-10-CM

## 2021-06-05 DIAGNOSIS — R45.851: Primary | ICD-10-CM

## 2021-06-05 LAB
ALBUMIN SERPL BCP-MCNC: 4 G/DL (ref 3.4–5)
ALP SERPL-CCNC: 76 U/L (ref 46–116)
ALT SERPL W P-5'-P-CCNC: 44 U/L (ref 12–78)
APAP SERPL-MCNC: < 2 UG/ML (ref 10–30)
AST SERPL W P-5'-P-CCNC: 26 U/L (ref 15–37)
BASOPHILS # BLD AUTO: 0 /CMM (ref 0–0.2)
BASOPHILS NFR BLD AUTO: 0.5 % (ref 0–2)
BILIRUB DIRECT SERPL-MCNC: 0.2 MG/DL (ref 0–0.2)
BILIRUB SERPL-MCNC: 0.7 MG/DL (ref 0.2–1)
BILIRUB UR QL STRIP: (no result)
BUN SERPL-MCNC: 20 MG/DL (ref 7–18)
CALCIUM SERPL-MCNC: 8.9 MG/DL (ref 8.5–10.1)
CHLORIDE SERPL-SCNC: 102 MMOL/L (ref 98–107)
CO2 SERPL-SCNC: 23 MMOL/L (ref 21–32)
COLOR UR: YELLOW
CREAT SERPL-MCNC: 1.1 MG/DL (ref 0.6–1.3)
EOSINOPHIL NFR BLD AUTO: 1.6 % (ref 0–6)
ETHANOL SERPL-MCNC: < 3 MG/DL (ref 0–0)
GLUCOSE SERPL-MCNC: 95 MG/DL (ref 74–106)
GLUCOSE UR STRIP-MCNC: NEGATIVE MG/DL
HCT VFR BLD AUTO: 48 % (ref 39–51)
HGB BLD-MCNC: 16.5 G/DL (ref 13.5–17.5)
LEUKOCYTE ESTERASE UR QL STRIP: NEGATIVE
LYMPHOCYTES NFR BLD AUTO: 2.3 /CMM (ref 0.8–4.8)
LYMPHOCYTES NFR BLD AUTO: 27.2 % (ref 20–44)
MCHC RBC AUTO-ENTMCNC: 34 G/DL (ref 31–36)
MCV RBC AUTO: 99 FL (ref 80–96)
MONOCYTES NFR BLD AUTO: 0.9 /CMM (ref 0.1–1.3)
MONOCYTES NFR BLD AUTO: 9.9 % (ref 2–12)
NEUTROPHILS # BLD AUTO: 5.3 /CMM (ref 1.8–8.9)
NEUTROPHILS NFR BLD AUTO: 60.8 % (ref 43–81)
NITRITE UR QL STRIP: NEGATIVE
PH UR STRIP: 5.5 [PH] (ref 5–8)
PLATELET # BLD AUTO: 185 /CMM (ref 150–450)
POTASSIUM SERPL-SCNC: 3.9 MMOL/L (ref 3.5–5.1)
PROT SERPL-MCNC: 7.8 G/DL (ref 6.4–8.2)
PROT UR QL STRIP: 30 MG/DL
RBC # BLD AUTO: 4.86 MIL/UL (ref 4.5–6)
RBC #/AREA URNS HPF: (no result) /HPF (ref 0–2)
SODIUM SERPL-SCNC: 137 MMOL/L (ref 136–145)
UROBILINOGEN UR STRIP-MCNC: 1 EU/DL
WBC #/AREA URNS HPF: (no result) /HPF (ref 0–3)
WBC NRBC COR # BLD AUTO: 8.7 K/UL (ref 4.3–11)

## 2021-06-05 PROCEDURE — 87426 SARSCOV CORONAVIRUS AG IA: CPT

## 2021-06-05 PROCEDURE — 80307 DRUG TEST PRSMV CHEM ANLYZR: CPT

## 2021-06-05 PROCEDURE — 81001 URINALYSIS AUTO W/SCOPE: CPT

## 2021-06-05 PROCEDURE — 80320 DRUG SCREEN QUANTALCOHOLS: CPT

## 2021-06-05 PROCEDURE — 80048 BASIC METABOLIC PNL TOTAL CA: CPT

## 2021-06-05 PROCEDURE — 80076 HEPATIC FUNCTION PANEL: CPT

## 2021-06-05 PROCEDURE — 80143 DRUG ASSAY ACETAMINOPHEN: CPT

## 2021-06-05 PROCEDURE — 99285 EMERGENCY DEPT VISIT HI MDM: CPT

## 2021-06-05 PROCEDURE — 85025 COMPLETE CBC W/AUTO DIFF WBC: CPT

## 2021-06-05 PROCEDURE — G0480 DRUG TEST DEF 1-7 CLASSES: HCPCS

## 2021-06-05 PROCEDURE — 36415 COLL VENOUS BLD VENIPUNCTURE: CPT

## 2021-06-05 PROCEDURE — C9803 HOPD COVID-19 SPEC COLLECT: HCPCS

## 2021-06-05 SDOH — ECONOMIC STABILITY - HOUSING INSECURITY: HOMELESSNESS: Z59.0

## 2021-06-06 VITALS — DIASTOLIC BLOOD PRESSURE: 71 MMHG | SYSTOLIC BLOOD PRESSURE: 128 MMHG

## 2021-06-06 NOTE — NUR
TRANSFER INFORMATION:

PT ACCEPTED AT [facility]

ACCEPTING MD SELLERS

PHONE NUMBER FOR REPORT [phone]

## 2021-12-14 ENCOUNTER — HOSPITAL ENCOUNTER (EMERGENCY)
Dept: HOSPITAL 54 - ER | Age: 46
Discharge: PSYCHIATRIC OR SUBSTANCE USE FACILITY | End: 2021-12-14
Payer: MEDICAID

## 2021-12-14 VITALS — WEIGHT: 240 LBS | HEIGHT: 66 IN | BODY MASS INDEX: 38.57 KG/M2

## 2021-12-14 VITALS — SYSTOLIC BLOOD PRESSURE: 146 MMHG | DIASTOLIC BLOOD PRESSURE: 89 MMHG

## 2021-12-14 DIAGNOSIS — N32.81: ICD-10-CM

## 2021-12-14 DIAGNOSIS — F31.9: Primary | ICD-10-CM

## 2021-12-14 DIAGNOSIS — F20.0: ICD-10-CM

## 2021-12-14 DIAGNOSIS — Z88.8: ICD-10-CM

## 2021-12-14 DIAGNOSIS — I10: ICD-10-CM

## 2021-12-14 DIAGNOSIS — F17.200: ICD-10-CM

## 2021-12-14 DIAGNOSIS — Z59.01: ICD-10-CM

## 2021-12-14 DIAGNOSIS — R45.851: ICD-10-CM

## 2021-12-14 DIAGNOSIS — J44.9: ICD-10-CM

## 2021-12-14 DIAGNOSIS — Z88.6: ICD-10-CM

## 2021-12-14 DIAGNOSIS — Z20.822: ICD-10-CM

## 2021-12-14 LAB
ALBUMIN SERPL BCP-MCNC: 4 G/DL (ref 3.4–5)
ALP SERPL-CCNC: 79 U/L (ref 46–116)
ALT SERPL W P-5'-P-CCNC: 24 U/L (ref 12–78)
APAP SERPL-MCNC: 0 UG/ML (ref 10–30)
AST SERPL W P-5'-P-CCNC: 14 U/L (ref 15–37)
BASOPHILS # BLD AUTO: 0.1 K/UL (ref 0–0.2)
BASOPHILS NFR BLD AUTO: 1.5 % (ref 0–2)
BILIRUB DIRECT SERPL-MCNC: 0.1 MG/DL (ref 0–0.2)
BILIRUB SERPL-MCNC: 0.2 MG/DL (ref 0.2–1)
BILIRUB UR QL STRIP: (no result)
BUN SERPL-MCNC: 15 MG/DL (ref 7–18)
CALCIUM SERPL-MCNC: 8.8 MG/DL (ref 8.5–10.1)
CHLORIDE SERPL-SCNC: 103 MMOL/L (ref 98–107)
CO2 SERPL-SCNC: 31 MMOL/L (ref 21–32)
COLOR UR: YELLOW
CREAT SERPL-MCNC: 1.2 MG/DL (ref 0.6–1.3)
EOSINOPHIL NFR BLD AUTO: 3.5 % (ref 0–6)
ETHANOL SERPL-MCNC: 8 MG/DL (ref 0–0)
GLUCOSE SERPL-MCNC: 105 MG/DL (ref 74–106)
HCT VFR BLD AUTO: 49 % (ref 39–51)
HGB BLD-MCNC: 17 G/DL (ref 13.5–17.5)
LYMPHOCYTES NFR BLD AUTO: 2.1 K/UL (ref 0.8–4.8)
LYMPHOCYTES NFR BLD AUTO: 24.7 % (ref 20–44)
MCHC RBC AUTO-ENTMCNC: 35 G/DL (ref 31–36)
MCV RBC AUTO: 95 FL (ref 80–96)
MONOCYTES NFR BLD AUTO: 0.5 K/UL (ref 0.1–1.3)
MONOCYTES NFR BLD AUTO: 6.4 % (ref 2–12)
NEUTROPHILS # BLD AUTO: 5.3 K/UL (ref 1.8–8.9)
NEUTROPHILS NFR BLD AUTO: 63.9 % (ref 43–81)
PH UR STRIP: 5.5 [PH] (ref 5–8)
PLATELET # BLD AUTO: 197 K/UL (ref 150–450)
POTASSIUM SERPL-SCNC: 4.1 MMOL/L (ref 3.5–5.1)
PROT SERPL-MCNC: 8.2 G/DL (ref 6.4–8.2)
RBC # BLD AUTO: 5.2 MIL/UL (ref 4.5–6)
SODIUM SERPL-SCNC: 141 MMOL/L (ref 136–145)
UROBILINOGEN UR STRIP-MCNC: 0.2 EU/DL
WBC NRBC COR # BLD AUTO: 8.4 K/UL (ref 4.3–11)

## 2021-12-14 PROCEDURE — 80076 HEPATIC FUNCTION PANEL: CPT

## 2021-12-14 PROCEDURE — 87426 SARSCOV CORONAVIRUS AG IA: CPT

## 2021-12-14 PROCEDURE — 99285 EMERGENCY DEPT VISIT HI MDM: CPT

## 2021-12-14 PROCEDURE — 80048 BASIC METABOLIC PNL TOTAL CA: CPT

## 2021-12-14 PROCEDURE — C9803 HOPD COVID-19 SPEC COLLECT: HCPCS

## 2021-12-14 PROCEDURE — 81003 URINALYSIS AUTO W/O SCOPE: CPT

## 2021-12-14 PROCEDURE — 80320 DRUG SCREEN QUANTALCOHOLS: CPT

## 2021-12-14 PROCEDURE — 80143 DRUG ASSAY ACETAMINOPHEN: CPT

## 2021-12-14 PROCEDURE — 85025 COMPLETE CBC W/AUTO DIFF WBC: CPT

## 2021-12-14 PROCEDURE — 80307 DRUG TEST PRSMV CHEM ANLYZR: CPT

## 2021-12-14 PROCEDURE — G0480 DRUG TEST DEF 1-7 CLASSES: HCPCS

## 2021-12-14 PROCEDURE — 36415 COLL VENOUS BLD VENIPUNCTURE: CPT

## 2021-12-14 SDOH — ECONOMIC STABILITY - HOUSING INSECURITY: SHELTERED HOMELESSNESS: Z59.01

## 2021-12-14 NOTE — NUR
BIBS C/O HAVING S/I WITH PLAN TO "CUT MY WRISTS". PATIENT DENIES H/I SEEKING 
VOLUNTARY ADMISSION TO [facility]. PATIENT ALERT AND ORIENTED X3. 
AMBULATORY WITH NON LABORED BREATHING.

## 2021-12-14 NOTE — NUR
[facility] CALLED AND WAS NOTIFIED OF PT ACCEPTANCE TO FACILITY

UNDER THE CARE OF DR. SELLERS 

NUMBER FOR REPORT- [phone] UNIT 2

## 2021-12-14 NOTE — NUR
SS Consult:

SS Consult requested for SI, drug abuse & homelessness. The pt. is a 46- year old Black male 
who presents to ED with C/O intermittent SI with plan to cut his wrist. The pt. appears 
unkempt is A&O X4 and makes good eye contact. Pt.s mood is dysphoric with depressed affect. 
Pt. states he has auditory and visual Hallucinations of flashing lights and radio sounds. 
RICARDO offered pt. voluntary admission to a psych facility for treatment and pt. is agreeable. 



RICARDO explored pt.s mental health Hx. Pt. states he has been diagnosed in the past with 
paranoid schizophrenia & bipolar disorder. SW explored pt.s living situation. Pt. admits he 
has been experiencing homelessness for the past 4 years. RICARDO explored pt.s drug & ETOH use. 
Patient stated he uses Cannabinoids when I can. SW provided addiction resources and he 
accepted them. Pt. states he is ambulatory. RICARDO explored pt.s support system. Pt. states he 
has no support system. Pt. states he receives Food stamps & SSI.



Plan: RICARDO referred pt. to [facility] for inpatient 
psychiatric treatment. 



Pt. signed homeless waiver and it was placed in the chart. RICARDO provided pt. with homeless, 
and mental health resources and he accepted them :

Year-round shelters: [facility] [place] [address] [place] [address] [phone]; 
[facility] [place] [address] [place] [address]; [facility] [address] 
[facility] [address] [phone]

Winter Shelters: 

SPA 2 | [facility]: [facility]

Address: Confidential  (call for location )

Phone Number: [phone]

Population Served: Coed

# of Beds: 57



SPA 4 | [facility]

Provider: [facility]

Address: [address] [place] [address] 

# of Beds: 49

Phone Number: [phone]

Population Served: Coed



SPA 6 | [facility]

Provider: [facility]

Address: [address] [place] [address] 

# of Beds: 49

Phone Number: [phone]

Population Served: [facility]

Provider: Sebas TOSCANO

Address: [address] [facility] [address]

# of Beds: 20

Phone Number: [phone]

Population Served: Women

 

AN Facility

Provider: [facility]

Address: [address] [facility] [address]

# of Beds: 30

Phone Number: [phone]

Population Served: Women

 

SPA 8 | [facility]

Provider: Jenaro of [place]

Address: [address] [facility] [address]

# of Beds: 65

Phone Number: [phone]

Population Served: Coed



Hygiene: [facility]: [address] [facility] [phone]; [employer] [facility] 
[address] [facility] [phone]; [facility] [address] [place] [phone].

Food Resources: [employer] at [facility]- [address] [facility]; Meet Each Need with [facility] ([facility]) [address] [facility]; [facility] Food [facility] [address] [facility]; [facility] 
[address] [facility]. 

Mental Health resources provided: [facility] [address] [place], [place] [address] [phone]; [facility] [employer] [address], 
[place] [address] [phone]; [facility] Ur[facility] Center 
[address] [place] [address] [phone]; [facility] [facility] [address] 
[place], [place] [address] [phone]

Healthcare Clinics: [facility] [address] [place]. 
[place] [phone]; [facility] [address] 
[place] [address]; [facility] [address] [facility]. [place] [address] [phone]



Counseling--Outpatient

[facility]

[address]

[place], [place] [address] [phone]

(Specializes in in-depth psychotherapy for emotional distress: anxiety, depression, 
interpersonal conflicts, life transitions, childhood abuse)



[facility]

[address] [place] [address] [phone]

(Assist with solving problem marital difficulties, separation & divorce, aging parents, 
death & grief, chronic & terminal illness)



[facility]

[address] [place] [address] [phone]

(Deal with loss & grief, anxiety, marital difficulties) 



Homebound/Mental Health Services

[address] [facility] [address]

[place] [address] [phone]

(Provide in-home mental services to people who are incapable of leaving their homes)



Organization for Needs of the Elderly

Senior Service/Resource Center

[address] [place] [address] [phone]



[facility] 

[address] [address] [phone]

PSYCHIATRIC OUTPATIENT SERVICES



[facility] Partial Hospitalization and Intensive Outpatient Program (Managed Care 
and [place] Only)[address] [facility] [phone]

[facility]

Partial Hospitalization and Outpatient [address] [facility]  [address]

[place], [place] [phone]

[facility] Center [address] [facility] [address]

[place], [place] [phone]

[facility] Partial Hospitalization and Outpatient 
[address] [place], .[phone]



Substance Abuse resources provided included: [facility] Substance Abuse 
Self-Helpline (SAS) [phone]; [facility] [address] [facility] [place] [address] 
[phone]; [facility] [address] [facility] [address] [phone]; 
[facility] [address] [place] [address] [phone]; [facility] [address] [facility] [address] [phone];  [facility] [address] Re[address] Orte[address] [facility] [address] 
[phone]; [facility] [address] [id number] [phone]; [facility] Substance Abuse Helpline(SAS)[facility] [phone]; [facility] Family Counseling  
[phone]; [facility] [phone] [place]; [facility]  [phone] [place]; [facility]  [phone] [place]; [facility] 
[phone] [facility]; [facility]  [phone] [place]; [facility] [phone] [place]; [facility] [phone] [place]; [facility]. 
[phone] [place]; Alcoholics Anonymous [phone] 923-6465-SFV; [facility] [phone]; Marijuana Anonymous [phone] [phone]-SFV; Narcotics Anonymous www.na.org;

## 2022-01-08 ENCOUNTER — HOSPITAL ENCOUNTER (EMERGENCY)
Dept: HOSPITAL 87 - ER | Age: 47
Discharge: HOME | End: 2022-01-08
Payer: MEDICAID

## 2022-01-08 VITALS — BODY MASS INDEX: 30.41 KG/M2 | HEIGHT: 68 IN | WEIGHT: 200.62 LBS

## 2022-01-08 VITALS — SYSTOLIC BLOOD PRESSURE: 158 MMHG | DIASTOLIC BLOOD PRESSURE: 91 MMHG

## 2022-01-08 DIAGNOSIS — Z86.73: ICD-10-CM

## 2022-01-08 DIAGNOSIS — R53.1: Primary | ICD-10-CM

## 2022-01-08 DIAGNOSIS — M54.42: ICD-10-CM

## 2022-01-08 DIAGNOSIS — M54.41: ICD-10-CM

## 2022-01-08 DIAGNOSIS — Z88.8: ICD-10-CM

## 2022-01-08 DIAGNOSIS — I10: ICD-10-CM

## 2022-01-08 PROCEDURE — 99283 EMERGENCY DEPT VISIT LOW MDM: CPT

## 2022-01-17 ENCOUNTER — HOSPITAL ENCOUNTER (EMERGENCY)
Dept: HOSPITAL 87 - ER | Age: 47
LOS: 1 days | Discharge: HOME | End: 2022-01-18
Payer: MEDICAID

## 2022-01-17 VITALS — BODY MASS INDEX: 40.39 KG/M2 | WEIGHT: 251.33 LBS | HEIGHT: 66 IN

## 2022-01-17 DIAGNOSIS — I10: ICD-10-CM

## 2022-01-17 DIAGNOSIS — F31.9: ICD-10-CM

## 2022-01-17 DIAGNOSIS — F20.9: ICD-10-CM

## 2022-01-17 DIAGNOSIS — R25.2: Primary | ICD-10-CM

## 2022-01-17 DIAGNOSIS — J45.909: ICD-10-CM

## 2022-01-17 DIAGNOSIS — Z20.822: ICD-10-CM

## 2022-01-17 DIAGNOSIS — Z79.899: ICD-10-CM

## 2022-01-17 DIAGNOSIS — F12.10: ICD-10-CM

## 2022-01-17 LAB
BASOPHILS NFR BLD AUTO: 0.7 % (ref 0–2)
CHLORIDE SERPL-SCNC: 107 MEQ/L (ref 98–107)
EOSINOPHIL NFR BLD AUTO: 2.6 % (ref 0–5)
ERYTHROCYTE [DISTWIDTH] IN BLOOD BY AUTOMATED COUNT: 13.6 % (ref 11.6–14.6)
HCT VFR BLD AUTO: 46.2 % (ref 42–52)
HGB BLD-MCNC: 16.2 G/DL (ref 14–18)
LYMPHOCYTES NFR BLD AUTO: 40 % (ref 20–50)
MCH RBC QN AUTO: 32.6 PG (ref 28–32)
MCV RBC AUTO: 93.1 FL (ref 80–94)
MONOCYTES NFR BLD AUTO: 7.8 % (ref 2–8)
NEUTROPHILS NFR BLD AUTO: 48.9 % (ref 40–76)
PLATELET # BLD AUTO: 162 X1000/UL (ref 130–400)
PMV BLD AUTO: 9.2 FL (ref 7.4–10.4)
RBC # BLD AUTO: 4.96 MILL/UL (ref 4.7–6.1)

## 2022-01-17 PROCEDURE — 71045 X-RAY EXAM CHEST 1 VIEW: CPT

## 2022-01-17 PROCEDURE — 80053 COMPREHEN METABOLIC PANEL: CPT

## 2022-01-17 PROCEDURE — 93005 ELECTROCARDIOGRAM TRACING: CPT

## 2022-01-17 PROCEDURE — 99285 EMERGENCY DEPT VISIT HI MDM: CPT

## 2022-01-17 PROCEDURE — 84484 ASSAY OF TROPONIN QUANT: CPT

## 2022-01-17 PROCEDURE — 83690 ASSAY OF LIPASE: CPT

## 2022-01-17 PROCEDURE — 96372 THER/PROPH/DIAG INJ SC/IM: CPT

## 2022-01-17 PROCEDURE — 36415 COLL VENOUS BLD VENIPUNCTURE: CPT

## 2022-01-17 PROCEDURE — 83880 ASSAY OF NATRIURETIC PEPTIDE: CPT

## 2022-01-17 PROCEDURE — 87426 SARSCOV CORONAVIRUS AG IA: CPT

## 2022-01-17 PROCEDURE — 85025 COMPLETE CBC W/AUTO DIFF WBC: CPT

## 2022-01-18 VITALS — SYSTOLIC BLOOD PRESSURE: 165 MMHG | DIASTOLIC BLOOD PRESSURE: 87 MMHG

## 2022-03-09 ENCOUNTER — HOSPITAL ENCOUNTER (EMERGENCY)
Dept: HOSPITAL 54 - ER | Age: 47
LOS: 1 days | Discharge: HOME | End: 2022-03-10
Payer: MEDICAID

## 2022-03-09 VITALS — BODY MASS INDEX: 35.36 KG/M2 | HEIGHT: 66 IN | WEIGHT: 220 LBS

## 2022-03-09 DIAGNOSIS — Z88.8: ICD-10-CM

## 2022-03-09 DIAGNOSIS — M19.90: ICD-10-CM

## 2022-03-09 DIAGNOSIS — F31.9: ICD-10-CM

## 2022-03-09 DIAGNOSIS — Z20.822: ICD-10-CM

## 2022-03-09 DIAGNOSIS — I10: ICD-10-CM

## 2022-03-09 DIAGNOSIS — J44.9: ICD-10-CM

## 2022-03-09 DIAGNOSIS — F20.0: Primary | ICD-10-CM

## 2022-03-09 DIAGNOSIS — N32.81: ICD-10-CM

## 2022-03-09 DIAGNOSIS — F17.200: ICD-10-CM

## 2022-03-09 DIAGNOSIS — Z59.01: ICD-10-CM

## 2022-03-09 DIAGNOSIS — R45.850: ICD-10-CM

## 2022-03-09 LAB
ALBUMIN SERPL BCP-MCNC: 3.5 G/DL (ref 3.4–5)
ALP SERPL-CCNC: 66 U/L (ref 46–116)
ALT SERPL W P-5'-P-CCNC: 26 U/L (ref 12–78)
APAP SERPL-MCNC: 0 UG/ML (ref 10–30)
AST SERPL W P-5'-P-CCNC: 16 U/L (ref 15–37)
BASOPHILS # BLD AUTO: 0 K/UL (ref 0–0.2)
BASOPHILS NFR BLD AUTO: 0.4 % (ref 0–2)
BILIRUB DIRECT SERPL-MCNC: 0.1 MG/DL (ref 0–0.2)
BILIRUB SERPL-MCNC: 0.3 MG/DL (ref 0.2–1)
BILIRUB UR QL STRIP: (no result)
BUN SERPL-MCNC: 17 MG/DL (ref 7–18)
CALCIUM SERPL-MCNC: 8.5 MG/DL (ref 8.5–10.1)
CHLORIDE SERPL-SCNC: 103 MMOL/L (ref 98–107)
CO2 SERPL-SCNC: 27 MMOL/L (ref 21–32)
COLOR UR: YELLOW
CREAT SERPL-MCNC: 1.2 MG/DL (ref 0.6–1.3)
EOSINOPHIL NFR BLD AUTO: 2.8 % (ref 0–6)
ETHANOL SERPL-MCNC: < 3 MG/DL (ref 0–0)
GLUCOSE SERPL-MCNC: 131 MG/DL (ref 74–106)
GLUCOSE UR STRIP-MCNC: NEGATIVE MG/DL
HCT VFR BLD AUTO: 44 % (ref 39–51)
HGB BLD-MCNC: 14.9 G/DL (ref 13.5–17.5)
LEUKOCYTE ESTERASE UR QL STRIP: NEGATIVE
LYMPHOCYTES NFR BLD AUTO: 2.9 K/UL (ref 0.8–4.8)
LYMPHOCYTES NFR BLD AUTO: 36.4 % (ref 20–44)
MCHC RBC AUTO-ENTMCNC: 34 G/DL (ref 31–36)
MCV RBC AUTO: 93 FL (ref 80–96)
MONOCYTES NFR BLD AUTO: 0.7 K/UL (ref 0.1–1.3)
MONOCYTES NFR BLD AUTO: 8.4 % (ref 2–12)
NEUTROPHILS # BLD AUTO: 4.1 K/UL (ref 1.8–8.9)
NEUTROPHILS NFR BLD AUTO: 52 % (ref 43–81)
NITRITE UR QL STRIP: NEGATIVE
PH UR STRIP: 6 [PH] (ref 5–8)
PLATELET # BLD AUTO: 206 K/UL (ref 150–450)
POTASSIUM SERPL-SCNC: 3.6 MMOL/L (ref 3.5–5.1)
PROT SERPL-MCNC: 7.2 G/DL (ref 6.4–8.2)
PROT UR QL STRIP: NEGATIVE MG/DL
RBC # BLD AUTO: 4.69 MIL/UL (ref 4.5–6)
RBC #/AREA URNS HPF: (no result) /HPF (ref 0–2)
SODIUM SERPL-SCNC: 136 MMOL/L (ref 136–145)
UROBILINOGEN UR STRIP-MCNC: 1 EU/DL
WBC #/AREA URNS HPF: (no result) /HPF (ref 0–3)
WBC NRBC COR # BLD AUTO: 7.9 K/UL (ref 4.3–11)

## 2022-03-09 PROCEDURE — G0480 DRUG TEST DEF 1-7 CLASSES: HCPCS

## 2022-03-09 PROCEDURE — 36415 COLL VENOUS BLD VENIPUNCTURE: CPT

## 2022-03-09 PROCEDURE — 81001 URINALYSIS AUTO W/SCOPE: CPT

## 2022-03-09 PROCEDURE — 85025 COMPLETE CBC W/AUTO DIFF WBC: CPT

## 2022-03-09 PROCEDURE — 99283 EMERGENCY DEPT VISIT LOW MDM: CPT

## 2022-03-09 PROCEDURE — 87426 SARSCOV CORONAVIRUS AG IA: CPT

## 2022-03-09 PROCEDURE — 80048 BASIC METABOLIC PNL TOTAL CA: CPT

## 2022-03-09 PROCEDURE — 80076 HEPATIC FUNCTION PANEL: CPT

## 2022-03-09 PROCEDURE — 80320 DRUG SCREEN QUANTALCOHOLS: CPT

## 2022-03-09 PROCEDURE — 80307 DRUG TEST PRSMV CHEM ANLYZR: CPT

## 2022-03-09 PROCEDURE — C9803 HOPD COVID-19 SPEC COLLECT: HCPCS

## 2022-03-09 PROCEDURE — 80143 DRUG ASSAY ACETAMINOPHEN: CPT

## 2022-03-09 SDOH — ECONOMIC STABILITY - HOUSING INSECURITY: SHELTERED HOMELESSNESS: Z59.01

## 2022-03-09 NOTE — NUR
TO ER BED 18, BIBS C/O "HEARING VOICES TO HURT PEOPLE" WANTS VOLUNTARY 
ADMISSION TO [facility], COOPERATIVE, AAOX3, BREATHING EVEN AND NON LABORED, 
AWAITING MD ORDERS

## 2022-03-10 VITALS — SYSTOLIC BLOOD PRESSURE: 136 MMHG | DIASTOLIC BLOOD PRESSURE: 70 MMHG

## 2022-03-10 NOTE — NUR
PT SLEEPING. PT IN NO ACUTE DISTRESS AT THIS TIME. RESP EVEN AND NON LABORED. 
SAFETY MEASURES CONTINUED.

## 2022-03-10 NOTE — NUR
[facility] CALLED FOR UPDATE 

-------------------------------------------------------------------------------

Addendum: 03/10/22 at 0451 by VERONICA

-------------------------------------------------------------------------------

[facility] CALLED FOR UPDATE

## 2022-03-10 NOTE — NUR
SPOKE TO BENJIE OF [facility], WAS TOLD THAT SHE WILL CALL [facility] TO 
CHECK FOR AVAILABLE BED FOR MALE

## 2022-03-10 NOTE — NUR
-------------------------------------------------------------------------------

           *** Note undone in [facility] - 03/10/22 at 1202 by YOUSUF ***           

-------------------------------------------------------------------------------

LUNCH TRAY PROVIDED. PATIENT TOLERATED PO WELL

## 2022-03-28 ENCOUNTER — HOSPITAL ENCOUNTER (EMERGENCY)
Dept: HOSPITAL 54 - ER | Age: 47
Discharge: PSYCHIATRIC OR SUBSTANCE USE FACILITY | End: 2022-03-28
Payer: MEDICAID

## 2022-03-28 VITALS — WEIGHT: 210 LBS | BODY MASS INDEX: 33.75 KG/M2 | HEIGHT: 66 IN

## 2022-03-28 VITALS — DIASTOLIC BLOOD PRESSURE: 96 MMHG | SYSTOLIC BLOOD PRESSURE: 155 MMHG

## 2022-03-28 DIAGNOSIS — F31.9: ICD-10-CM

## 2022-03-28 DIAGNOSIS — Z59.00: ICD-10-CM

## 2022-03-28 DIAGNOSIS — F20.0: ICD-10-CM

## 2022-03-28 DIAGNOSIS — J44.9: ICD-10-CM

## 2022-03-28 DIAGNOSIS — R45.851: Primary | ICD-10-CM

## 2022-03-28 DIAGNOSIS — Z20.822: ICD-10-CM

## 2022-03-28 DIAGNOSIS — N32.81: ICD-10-CM

## 2022-03-28 DIAGNOSIS — Z91.19: ICD-10-CM

## 2022-03-28 LAB
ALBUMIN SERPL BCP-MCNC: 3.8 G/DL (ref 3.4–5)
ALP SERPL-CCNC: 67 U/L (ref 46–116)
ALT SERPL W P-5'-P-CCNC: 25 U/L (ref 12–78)
APAP SERPL-MCNC: 0 UG/ML (ref 10–30)
AST SERPL W P-5'-P-CCNC: 17 U/L (ref 15–37)
BASOPHILS # BLD AUTO: 0 K/UL (ref 0–0.2)
BASOPHILS NFR BLD AUTO: 0.7 % (ref 0–2)
BILIRUB DIRECT SERPL-MCNC: 0.1 MG/DL (ref 0–0.2)
BILIRUB SERPL-MCNC: 0.3 MG/DL (ref 0.2–1)
BILIRUB UR QL STRIP: NEGATIVE
BUN SERPL-MCNC: 11 MG/DL (ref 7–18)
CALCIUM SERPL-MCNC: 8.8 MG/DL (ref 8.5–10.1)
CHLORIDE SERPL-SCNC: 105 MMOL/L (ref 98–107)
CO2 SERPL-SCNC: 26 MMOL/L (ref 21–32)
COLOR UR: YELLOW
CREAT SERPL-MCNC: 1.3 MG/DL (ref 0.6–1.3)
EOSINOPHIL NFR BLD AUTO: 10.9 % (ref 0–6)
ETHANOL SERPL-MCNC: < 3 MG/DL (ref 0–0)
GLUCOSE SERPL-MCNC: 93 MG/DL (ref 74–106)
GLUCOSE UR STRIP-MCNC: NEGATIVE MG/DL
HCT VFR BLD AUTO: 45 % (ref 39–51)
HGB BLD-MCNC: 15.1 G/DL (ref 13.5–17.5)
LEUKOCYTE ESTERASE UR QL STRIP: NEGATIVE
LYMPHOCYTES NFR BLD AUTO: 1.8 K/UL (ref 0.8–4.8)
LYMPHOCYTES NFR BLD AUTO: 33.7 % (ref 20–44)
MCHC RBC AUTO-ENTMCNC: 34 G/DL (ref 31–36)
MCV RBC AUTO: 94 FL (ref 80–96)
MONOCYTES NFR BLD AUTO: 0.4 K/UL (ref 0.1–1.3)
MONOCYTES NFR BLD AUTO: 7.7 % (ref 2–12)
NEUTROPHILS # BLD AUTO: 2.5 K/UL (ref 1.8–8.9)
NEUTROPHILS NFR BLD AUTO: 47 % (ref 43–81)
NITRITE UR QL STRIP: NEGATIVE
PH UR STRIP: 5.5 [PH] (ref 5–8)
PLATELET # BLD AUTO: 165 K/UL (ref 150–450)
POTASSIUM SERPL-SCNC: 3.9 MMOL/L (ref 3.5–5.1)
PROT SERPL-MCNC: 7.5 G/DL (ref 6.4–8.2)
PROT UR QL STRIP: NEGATIVE MG/DL
RBC # BLD AUTO: 4.77 MIL/UL (ref 4.5–6)
RBC #/AREA URNS HPF: (no result) /HPF (ref 0–2)
SODIUM SERPL-SCNC: 137 MMOL/L (ref 136–145)
UROBILINOGEN UR STRIP-MCNC: 0.2 EU/DL
WBC #/AREA URNS HPF: (no result) /HPF (ref 0–3)
WBC NRBC COR # BLD AUTO: 5.2 K/UL (ref 4.3–11)

## 2022-03-28 PROCEDURE — 80076 HEPATIC FUNCTION PANEL: CPT

## 2022-03-28 PROCEDURE — 80320 DRUG SCREEN QUANTALCOHOLS: CPT

## 2022-03-28 PROCEDURE — 99285 EMERGENCY DEPT VISIT HI MDM: CPT

## 2022-03-28 PROCEDURE — 81001 URINALYSIS AUTO W/SCOPE: CPT

## 2022-03-28 PROCEDURE — G0480 DRUG TEST DEF 1-7 CLASSES: HCPCS

## 2022-03-28 PROCEDURE — 80048 BASIC METABOLIC PNL TOTAL CA: CPT

## 2022-03-28 PROCEDURE — 36415 COLL VENOUS BLD VENIPUNCTURE: CPT

## 2022-03-28 PROCEDURE — C9803 HOPD COVID-19 SPEC COLLECT: HCPCS

## 2022-03-28 PROCEDURE — 85025 COMPLETE CBC W/AUTO DIFF WBC: CPT

## 2022-03-28 PROCEDURE — 80143 DRUG ASSAY ACETAMINOPHEN: CPT

## 2022-03-28 PROCEDURE — 87426 SARSCOV CORONAVIRUS AG IA: CPT

## 2022-03-28 PROCEDURE — 80307 DRUG TEST PRSMV CHEM ANLYZR: CPT

## 2022-03-28 SDOH — ECONOMIC STABILITY - HOUSING INSECURITY: HOMELESSNESS UNSPECIFIED: Z59.00

## 2022-03-28 NOTE — NUR
PATIENT BIBS. SUICIDAL IDEATION PLANS TO RUN INTO TRAFFIC. SEEKING VOLUNTARY 
ADMIT. PATIENT TAKEN TO ER BED 18. PATIENT PLACED IN HOSPITAL GOWN. PATIENT 
BELONGINGS PLACED IN LOCKER. PATIENT A/O X 4, RR EVEN AND UNLABORED, NO SOB 
NOTED. PT VSS. WILL CONTINUE TO MONITOR.

## 2022-03-29 ENCOUNTER — HOSPITAL ENCOUNTER (EMERGENCY)
Dept: HOSPITAL 54 - ER | Age: 47
Discharge: LEFT BEFORE DISCHARGE | End: 2022-03-29
Payer: MEDICAID

## 2022-03-29 VITALS — HEIGHT: 66 IN | WEIGHT: 210 LBS | BODY MASS INDEX: 33.75 KG/M2

## 2022-03-29 VITALS — DIASTOLIC BLOOD PRESSURE: 65 MMHG | SYSTOLIC BLOOD PRESSURE: 133 MMHG

## 2022-03-29 DIAGNOSIS — Z53.21: Primary | ICD-10-CM

## 2023-11-23 NOTE — NUR
CALLED [facility] FOR TRANSPORT TO [facility]. ETA 1730.
Diet given, Ate 100%. Called [facility] and was told by intake personnel that 
"NO beds available at this time"
No Beds available in [facility] at this time per Intake. Pt updated. No acute 
changes/distress noted
Per RN Gutierrez Pt accepted to [facility] report given by same. Pt updated
RICARDO followed-up with Mini at [facility] Intake [phone]. Per Mini, 
accepting information was provided to ED staff. Patient has been accepted to [address] [address]. Per Mini, wheelchair accessible 
room/ wheelchair bed became available approximately 2/3pm this afternoon. 



SW remains available for all needs regarding this patient.
RICARDO followed-up with [facility] [phone]. RICARDO spoke with Tere regarding 
patient's status of wheelchair accessible room. Per Tere, at this time rooms remain 
occupied. Tere to follow-up with Nursing Supervisor regarding status wheelchair accessible 
rooms. 



Plan: RICARDO to contact Tito at [facility] [phone] to follow-up with 
wheelchair accessible rooms at other [facility] locations. RICARDO remains 
available for all needs regarding this patient.
RICARDO followed-up with [facility] [phone]. RICARDO spoke with Mini regarding 
patient's status of wheelchair accessible room. Per Mini, rooms remain occupied with no 
discharge orders at this time. Mini reported to this SW that clinicals have been sent to 
[facility] and [facility]. Mini reported that [facility] 
cannot accept the patient as they do not have wheelchair accessible rooms. Mini will 
follow-up with [facility]. 



Plan: RICARDO to fax clinicals to [facility] intake [phone] for voluntary 
referral. RICARDO to follow up with a [facility] intake representative at 
[phone].
SW attempted to follow-up with [facility] [phone], per Phil 
(), intake representative are unavailable at this time. Phil asked this SW to 
follow-up in approximately 5-10 minutes. 



Plan: SW will follow-up with [facility] regarding patient's status. SW 
remains available for all needs regarding this patient.
SW followed-up regarding status of patient's referral to [facility]. SW 
spoke with  [phone] at [facility]. Per , intake needs medical 
clearance note from doctor. Per , [facility] is currently undergoing 
discharges and  will provide an update regarding this patient's referral. 



Plan: SW will follow-up regarding patient's referral if no referral is provided with 
[facility] [phone]. SW remains available for all needs regarding this 
patient.
SW met with the patient. Patient is alert and oriented x4. Patient is a 45 year-old  
American male. Patient uses a wheelchair following C-spine surgery several years ago. 
Patient presented to [facility] ED with suicidal ideation with a plan to cut his wrist. Patient 
informed this SW that the patient has a mental health diagnosis of the following; Paranoid 
Schizophrenia and Bipolar Disorder. Patient reported to this SW that the patient ran out of 
his mental health medications approximately 3-4 days ago. Patient reports visual and 
auditory hallucinations. Patient reports auditory hallucinations telling him to kill himself 
and visual hallucinations of shadows. Patient denies alcohol use. Patient reported the use 
of Marijuana and cigarettes daily. Patient reported to this SW that he is currently living 
in a trailer park in [facility] [place]. Patient reports living there for a few months, patient 
estimated since the beginning of the COVID-19 pandemic. Patient at this time did not want to 
report if he has a source of income. Patient and SW discussed the need for mental health 
resources for out-patient follow-up. Patient was receptive to receiving these resources. SW 
and patient discussed voluntary psychiatric hospitalization for patient's suicidal ideation. 
Patient was agreeable. SW to continue to follow-up regarding patient's referral status with 
[facility]. Patient was calm and cooperative throughout this assessment. 
Patient's speech was clear and concise. Patient is able to make his needs known. 



Plan: SW to follow-up with [facility] [phone]. SW to coordinate for a 
safe and proper discharge for this patient. SW remains available for all needs regarding 
this patient.
SW provided the following resources to this patient: 



Mental Health resources provided: [facility] [address] [place] [address] [phone]; [facility] [address] [facility] [address], 
[place] [address] [phone]; [facility] 
[address] [place] [address] [phone]; [facility] [address] 
[place] [address] [phone]



SW remains available for all needs regarding this patient.
bibself c/o +si plans to cut himself. denies si. pt aox4 rr even and unlabored. 
no sob noted. no nvd at thi time. no acute distress noted. pt calm and 
cooperative. pt provided with ua cup.
call from lab. rapid covid negative.
facesheet and clinicals faxed to [facility].
report given to RYLIE Hsieh for continuity of care.
16-Oct-2023